# Patient Record
Sex: MALE | Race: OTHER | HISPANIC OR LATINO | Employment: STUDENT | URBAN - METROPOLITAN AREA
[De-identification: names, ages, dates, MRNs, and addresses within clinical notes are randomized per-mention and may not be internally consistent; named-entity substitution may affect disease eponyms.]

---

## 2019-01-24 ENCOUNTER — OFFICE VISIT (OUTPATIENT)
Dept: INTERNAL MEDICINE CLINIC | Facility: CLINIC | Age: 24
End: 2019-01-24
Payer: COMMERCIAL

## 2019-01-24 VITALS
HEIGHT: 69 IN | SYSTOLIC BLOOD PRESSURE: 94 MMHG | WEIGHT: 132 LBS | BODY MASS INDEX: 19.55 KG/M2 | HEART RATE: 66 BPM | DIASTOLIC BLOOD PRESSURE: 60 MMHG | OXYGEN SATURATION: 96 %

## 2019-01-24 DIAGNOSIS — R21 RASH OF BACK: Primary | ICD-10-CM

## 2019-01-24 DIAGNOSIS — D75.A G6PD DEFICIENCY: ICD-10-CM

## 2019-01-24 DIAGNOSIS — K21.9 GASTROESOPHAGEAL REFLUX DISEASE WITHOUT ESOPHAGITIS: ICD-10-CM

## 2019-01-24 PROCEDURE — 1036F TOBACCO NON-USER: CPT | Performed by: INTERNAL MEDICINE

## 2019-01-24 PROCEDURE — 99204 OFFICE O/P NEW MOD 45 MIN: CPT | Performed by: INTERNAL MEDICINE

## 2019-01-24 PROCEDURE — 3008F BODY MASS INDEX DOCD: CPT | Performed by: INTERNAL MEDICINE

## 2019-01-24 RX ORDER — KETOCONAZOLE 20 MG/ML
1 SHAMPOO TOPICAL DAILY
Qty: 120 ML | Refills: 3 | Status: SHIPPED | OUTPATIENT
Start: 2019-01-24

## 2019-01-24 NOTE — PROGRESS NOTES
INTERNAL MEDICINE OFFICE VISIT  [facility] AT [address]  Tel: [phone]      NAME: Danielle Marie  AGE: 26 y o  SEX: male  : 1995   MRN: [id number]    DATE: 2019  TIME: 4:06 PM      Assessment and Plan:  1  Rash of back  Patient has a rash on his back for a few months now  It is not improving with hydrocortisone  He was told to stop using the hydrocortisone and use the ketoconazole shampoo before shower  If he does not get better, he will see the dermatologist     - ketoconazole (NIZORAL) 2 % shampoo; Apply 1 application topically daily  Dispense: 120 mL; Refill: 3    2  Gastroesophageal reflux disease without esophagitis  He was told to continue to take Tums as needed and avoid eating spices and caffeine    3  G6PD deficiency ([address] )    Stable      - Counseling Documentation: patient was counseled regarding: instructions for management, risk factor reductions, prognosis, patient and family education, impressions, risks and benefits of treatment options and importance of compliance with treatment  - Medication Side Effects: Adverse side effects of medications were reviewed with the patient/guardian today  Return for follow up visit in as needed or earlier, if needed  Chief Complaint:  Chief Complaint   Patient presents with   Ben Abt Establish Care    Physical Exam    Rash         History of Present Illness:   Is a new patient was here to establish  Rash on the back patient has a hypopigmented rash on the upper back for the last few months  It was itchy in the beginning and now it is better  It does become dry and he has to put moisturizer on it  GERD-he has some epigastric tenderness off and on which happens more when he eats spicy food  G6 PD deficiency-he is stable  He was told to keep a list of medications with him that need to be avoided with G6PD deficiency        Active Problem List:  Patient Active Problem List   Diagnosis    Rash of back    Gastroesophageal reflux disease without esophagitis    G6PD deficiency ([facility] )         Past Medical History:  Past Medical History:   Diagnosis Date    Asthma     G6PD deficiency ([facility] )          Past Surgical History:  History reviewed  No pertinent surgical history        Family History:  Family History   Problem Relation Age of Onset    Diabetes Maternal Grandmother          Social History:  Social History     Social History    Marital status: Single     Spouse name: N/A    Number of children: N/A    Years of education: N/A     Social History Main Topics    Smoking status: Never Smoker    Smokeless tobacco: Never Used    Alcohol use Yes      Comment: Social    Drug use: No    Sexual activity: Yes     Other Topics Concern    None     Social History Narrative    None         Allergies:  No Known Allergies      Medications:    Current Outpatient Prescriptions:     ketoconazole (NIZORAL) 2 % shampoo, Apply 1 application topically daily, Disp: 120 mL, Rfl: 3      The following portions of the patient's history were reviewed and updated as appropriate: past medical history, past surgical history, family history, social history, allergies, current medications and active problem list       Review of Systems:  Constitutional: Denies fever, chills, weight gain, weight loss, fatigue  Eyes: Denies eye redness, eye discharge, double vision, change in visual acuity  ENT: Denies hearing loss, tinnitus, sneezing, nasal congestion, nasal discharge, sore throat   Respiratory: Denies cough, expectoration, hemoptysis, shortness of breath, wheezing  Cardiovascular: Denies chest pain, palpitations, lower extremity swelling, orthopnea, PND  Gastrointestinal: Denies abdominal pain, heartburn, nausea, vomiting, hematemesis, diarrhea, bloody stools  Genito-Urinary: Denies dysuria, frequency, difficulty in micturition, nocturia, incontinence  Musculoskeletal: Denies back pain, joint pain, muscle pain  Neurologic: Denies confusion, lightheadedness, syncope, headache, focal weakness, sensory changes, seizures  Endocrine: Denies polyuria, polydipsia, temperature intolerance  Allergy and Immunology: Denies hives, insect bite sensitivity  Hematological and Lymphatic: Denies bleeding problems, swollen glands   Psychological: Denies depression, suicidal ideation, anxiety, panic, mood swings  Dermatological: Denies pruritus,  Has a rash      Vitals:  Vitals:    01/24/19 1551   BP: 94/60   Pulse: 66   SpO2: 96%       Body mass index is 19 49 kg/m²  Weight (last 2 days)     Date/Time   Weight    01/24/19 1551  59 9 (132)                Physical Examination:  General: Patient is not in acute distress  Awake, alert, responding to commands  No weight gain or loss  Head: Normocephalic  Atraumatic  Eyes: Conjunctiva and lids with no swelling, erythema or discharge  Both pupils normal sized, round and reactive to light  Sclera nonicteric  ENT: External examination of nose and ear normal  Otoscopic examination shows translucent tympanic membranes with patent canals without erythema  Oropharynx moist with no erythema, edema, exudate or lesions  Neck: Supple  JVP not raised  Trachea midline  No masses  No thyromegaly  Lungs: No signs of increased work of breathing or respiratory distress  Bilateral bronchovascular breath sounds with no crackles or rhonchi  Chest wall: No tenderness  Cardiovascular: Normal PMI  No thrills  Regular rate and rhythm  S1 and S2 normal  No murmur, rub or gallop  Gastrointestinal: Abdomen soft, nontender  No guarding or rigidity  Liver and spleen not palpable  Bowel sounds present  Neurologic: Cranial nerves II-XII intact   Cortical functions normal  Motor system - Reflexes 2+ and symmetrical  Sensations normal  Musculoskeletal: Gait normal  No joint tenderness  Integumentary: Skin kalyn on upper back, hypopigmented  Lymphatic: No palpable lymph nodes in neck, axilla or groin  Extremities: No clubbing, cyanosis, edema or varicosities  Psychological: Judgement and insight normal  Mood and affect normal      Laboratory Results:  CBC with diff:   No results found for: WBC, RBC, HGB, HCT, MCV, MCH, RDW, PLT    CMP:  No results found for: CREATININE, BUN, NA, K, CL, CO2, GLUCOSE, PROT, ALKPHOS, ALT, AST, BILIDIR    No results found for: HGBA1C, MG, PHOS    No results found for: TROPONINI, CKMB, CKTOTAL    Lipid Profile:   No results found for: CHOL  No results found for: HDL  No results found for: LDLCALC  No results found for: TRIG      Health Maintenance:  Health Maintenance   Topic Date Due    DTaP,Tdap,and Td Vaccines (1 - Tdap) 10/23/2017    INFLUENZA VACCINE  07/01/2018    Depression Screening PHQ  01/24/2020       There is no immunization history on file for this patient        Shauna Trejo MD  8/86/2204,9:26 PM